# Patient Record
Sex: FEMALE | Race: WHITE
[De-identification: names, ages, dates, MRNs, and addresses within clinical notes are randomized per-mention and may not be internally consistent; named-entity substitution may affect disease eponyms.]

---

## 2018-06-21 LAB
BUN BLD-MCNC: 14 MG/DL (ref 7–18)
GLUCOSE SERPLBLD-MCNC: 88 MG/DL (ref 74–106)
HCT VFR BLD CALC: 41.6 % (ref 36–45)
LYMPHOCYTES # SPEC AUTO: 3.3 K/UL (ref 0.7–4.9)
MCH RBC QN AUTO: 30.3 PG (ref 27–35)
MCV RBC: 91 FL (ref 80–100)
PMV BLD: 8.7 FL (ref 7.6–11.3)
POTASSIUM SERPL-SCNC: 3.9 MMOL/L (ref 3.5–5.1)
RBC # BLD: 4.58 M/UL (ref 3.86–4.86)

## 2018-06-25 ENCOUNTER — HOSPITAL ENCOUNTER (OUTPATIENT)
Dept: HOSPITAL 97 - OR | Age: 43
Discharge: HOME | End: 2018-06-25
Attending: OBSTETRICS & GYNECOLOGY
Payer: COMMERCIAL

## 2018-06-25 DIAGNOSIS — N84.0: ICD-10-CM

## 2018-06-25 DIAGNOSIS — F41.9: ICD-10-CM

## 2018-06-25 DIAGNOSIS — N91.2: Primary | ICD-10-CM

## 2018-06-25 DIAGNOSIS — E07.9: ICD-10-CM

## 2018-06-25 PROCEDURE — 36415 COLL VENOUS BLD VENIPUNCTURE: CPT

## 2018-06-25 PROCEDURE — 86850 RBC ANTIBODY SCREEN: CPT

## 2018-06-25 PROCEDURE — 86900 BLOOD TYPING SEROLOGIC ABO: CPT

## 2018-06-25 PROCEDURE — 0UB97ZX EXCISION OF UTERUS, VIA NATURAL OR ARTIFICIAL OPENING, DIAGNOSTIC: ICD-10-PCS

## 2018-06-25 PROCEDURE — 80048 BASIC METABOLIC PNL TOTAL CA: CPT

## 2018-06-25 PROCEDURE — 81025 URINE PREGNANCY TEST: CPT

## 2018-06-25 PROCEDURE — 0UJD8ZZ INSPECTION OF UTERUS AND CERVIX, VIA NATURAL OR ARTIFICIAL OPENING ENDOSCOPIC: ICD-10-PCS

## 2018-06-25 PROCEDURE — 86901 BLOOD TYPING SEROLOGIC RH(D): CPT

## 2018-06-25 PROCEDURE — 85025 COMPLETE CBC W/AUTO DIFF WBC: CPT

## 2018-06-25 PROCEDURE — 0UDB7ZX EXTRACTION OF ENDOMETRIUM, VIA NATURAL OR ARTIFICIAL OPENING, DIAGNOSTIC: ICD-10-PCS

## 2018-06-25 PROCEDURE — 88305 TISSUE EXAM BY PATHOLOGIST: CPT

## 2018-06-25 NOTE — P.OP
Preoperative diagnosis: Abnormal Ultrasound, Amenorrhea


Postoperative diagnosis: Same with sessile polyps, fluffy endometrium


Primary procedure: Hysteroscopy, D&C


Secondary procedure: Polypectomy


Anesthesia: General


Estimated blood loss: Minimal


Specimen: Polyps, endometrial curretings


Findings: See operative report


Operative Technique: 


FINDINGS:  The appearing external genitalia, vagina, cervix.  Good pelvic 

support noted.  Cervical stenosis.  Fluffy endometrium and polyps noted.  

Uterine cavity appears normal without fibroids.  Bilateral tubal ostia 

visualized.





PROCEDURE:


After informed consent was obtained, the patient was taken to the operating 

room where her general anesthesia was obtained without difficulty. She was 

prepped and draped in the dorsal lithotomy position. 


Her bladder was drained with a red rubber catheter and a right angle vaginal 

retractors Mcadams retractor were used to bring the cervix into view. The 

anterior lip of the cervix was grasped with a single tooth tenaculum.  The 

uterus was gently sounded to 8 cm. Dilation with Hanks dilators was performed 

with without difficulty. A 5 mm hysteroscope was advanced with saline infusion.

 Inspection of the uterine cavity revealed findings as above. Bilateral tubal 

ostia were visualized, andthe uterine cavity revealed a fluffy appearing 

endometrial lining with polyps noted.  Images were taken.  The hysteroscope was 

removed. Next, sharp and serrated curettes were used to obtain endometrial 

curettings.  Curettings were collected on [facility] to be sent to pathology.  Next, 

polyp forceps were used for polypectomy. Two polyps were removed and sent to 

pathology.  Curetting was done once again.  This completed the case. The 

tenaculum was removed and hemostasis was achieved with the use of pressure and 

silver nitrate. There were no complications. Patient tolerated the procedure 

well.





All instruments were removed and counts were correct x2. She was taken to PACU 

in good condition.





Pathology: Endometrial curettings, uterine polyps





Complications: None


Fluids & blood products: Per anesthesia


Transferred to: Recovery Room


Condition: Good